# Patient Record
Sex: MALE | Race: WHITE | URBAN - METROPOLITAN AREA
[De-identification: names, ages, dates, MRNs, and addresses within clinical notes are randomized per-mention and may not be internally consistent; named-entity substitution may affect disease eponyms.]

---

## 2022-05-20 ENCOUNTER — OFFICE VISIT (OUTPATIENT)
Dept: URBAN - METROPOLITAN AREA CLINIC 60 | Facility: CLINIC | Age: 77
End: 2022-05-20
Payer: MEDICARE

## 2022-05-20 DIAGNOSIS — H43.813 VITREOUS DEGENERATION, BILATERAL: ICD-10-CM

## 2022-05-20 PROCEDURE — 99204 OFFICE O/P NEW MOD 45 MIN: CPT | Performed by: OPHTHALMOLOGY

## 2022-05-20 ASSESSMENT — INTRAOCULAR PRESSURE
OS: 14
OD: 14

## 2022-05-20 ASSESSMENT — KERATOMETRY
OD: 44.34
OS: 44.48

## 2022-05-20 ASSESSMENT — VISUAL ACUITY
OS: 20/40
OD: 20/40

## 2022-05-20 NOTE — IMPRESSION/PLAN
Impression: Combined forms of age-related cataract, bilateral: H25.813. Plan: Cataract accounts for pt complaints. Pt desires sx. Schedule CE/IOL both eyes, right then left. Risk/Benefits/Alternatives discussed with patient. Rec. mono-focal/Toric/Vivity. Consider ORA/LRI. RL2. Target near. Order a-scan. Possible iris ring. Patient is a candidate for Dexycu. Discussed R/B/A. Recommend Ofloxacin or Tobramycin QID for 1 week postop. Intra-cameral Moxifloxacin to decrease the risk of infection. May elect to use combination drops or generics per patient preference.

## 2022-05-26 ENCOUNTER — TESTING ONLY (OUTPATIENT)
Dept: URBAN - METROPOLITAN AREA CLINIC 60 | Facility: CLINIC | Age: 77
End: 2022-05-26
Payer: MEDICARE

## 2022-05-26 DIAGNOSIS — H25.813 COMBINED FORMS OF AGE-RELATED CATARACT, BILATERAL: Primary | ICD-10-CM

## 2022-05-26 PROCEDURE — 92025 CPTRIZED CORNEAL TOPOGRAPHY: CPT | Performed by: OPHTHALMOLOGY

## 2022-05-26 RX ORDER — OFLOXACIN 3 MG/ML
0.3 % SOLUTION/ DROPS OPHTHALMIC
Qty: 5 | Refills: 1 | Status: ACTIVE
Start: 2022-05-26

## 2022-05-26 ASSESSMENT — PACHYMETRY
OD: 25.55
OD: 3.36
OS: 3.45
OS: 26.24

## 2022-06-07 ENCOUNTER — PROCEDURE (OUTPATIENT)
Dept: URBAN - METROPOLITAN AREA SURGERY 37 | Facility: SURGERY | Age: 77
End: 2022-06-07
Payer: MEDICARE

## 2022-06-07 DIAGNOSIS — H57.03 MIOSIS: ICD-10-CM

## 2022-06-07 DIAGNOSIS — H52.223 REGULAR ASTIGMATISM, BILATERAL: Primary | ICD-10-CM

## 2022-06-07 DIAGNOSIS — H25.813 COMBINED FORMS OF AGE-RELATED CATARACT, BILATERAL: ICD-10-CM

## 2022-06-07 PROCEDURE — 66982 XCAPSL CTRC RMVL CPLX WO ECP: CPT | Performed by: OPHTHALMOLOGY

## 2022-06-07 PROCEDURE — PR1CP PR1CP: CUSTOM | Performed by: OPHTHALMOLOGY

## 2022-06-08 ENCOUNTER — POST-OPERATIVE VISIT (OUTPATIENT)
Dept: URBAN - METROPOLITAN AREA CLINIC 60 | Facility: CLINIC | Age: 77
End: 2022-06-08
Payer: MEDICARE

## 2022-06-08 DIAGNOSIS — Z48.810 ENCOUNTER FOR SURGICAL AFTERCARE FOLLOWING SURGERY ON A SENSE ORGAN: Primary | ICD-10-CM

## 2022-06-08 PROCEDURE — 99024 POSTOP FOLLOW-UP VISIT: CPT | Performed by: OPTOMETRIST

## 2022-06-08 ASSESSMENT — INTRAOCULAR PRESSURE: OD: 16

## 2022-06-08 NOTE — IMPRESSION/PLAN
Impression: S/P Cataract Extraction by phacoemulsification with IOL placement; ORA; LRI (Limbal Relaxing Incision) OD - 1 Day. Encounter for surgical aftercare following surgery on a sense organ  Z48.810. Plan: Continue with Ofloxacin OD. Recommend patient use artificial tears.

## 2022-06-15 ENCOUNTER — POST-OPERATIVE VISIT (OUTPATIENT)
Dept: URBAN - METROPOLITAN AREA CLINIC 60 | Facility: CLINIC | Age: 77
End: 2022-06-15
Payer: MEDICARE

## 2022-06-15 DIAGNOSIS — Z48.810 ENCOUNTER FOR SURGICAL AFTERCARE FOLLOWING SURGERY ON A SENSE ORGAN: Primary | ICD-10-CM

## 2022-06-15 PROCEDURE — 99024 POSTOP FOLLOW-UP VISIT: CPT | Performed by: OPTOMETRIST

## 2022-06-15 ASSESSMENT — VISUAL ACUITY
OS: 20/50
OD: 20/40

## 2022-06-15 ASSESSMENT — INTRAOCULAR PRESSURE
OD: 16
OS: 14

## 2022-06-15 NOTE — IMPRESSION/PLAN
Impression: S/P CE/Standard IOL with LRI OD - 8 Days. Encounter for surgical aftercare following surgery on a sense organ  Z48.810.  Plan:

## 2022-06-21 ENCOUNTER — PROCEDURE (OUTPATIENT)
Dept: URBAN - METROPOLITAN AREA SURGERY 37 | Facility: SURGERY | Age: 77
End: 2022-06-21
Payer: MEDICARE

## 2022-06-21 DIAGNOSIS — H25.812 COMBINED FORMS OF AGE-RELATED CATARACT, LEFT EYE: ICD-10-CM

## 2022-06-21 DIAGNOSIS — H52.223 REGULAR ASTIGMATISM, BILATERAL: Primary | ICD-10-CM

## 2022-06-21 PROCEDURE — PR1CP PR1CP: CUSTOM | Performed by: OPHTHALMOLOGY

## 2022-06-21 PROCEDURE — 66982 XCAPSL CTRC RMVL CPLX WO ECP: CPT | Performed by: OPHTHALMOLOGY

## 2022-06-22 ENCOUNTER — POST-OPERATIVE VISIT (OUTPATIENT)
Dept: URBAN - METROPOLITAN AREA CLINIC 60 | Facility: CLINIC | Age: 77
End: 2022-06-22
Payer: MEDICARE

## 2022-06-22 DIAGNOSIS — Z96.1 PRESENCE OF INTRAOCULAR LENS: Primary | ICD-10-CM

## 2022-06-22 PROCEDURE — 99024 POSTOP FOLLOW-UP VISIT: CPT | Performed by: OPTOMETRIST

## 2022-06-22 NOTE — IMPRESSION/PLAN
Impression: S/P Cataract Extraction by phacoemulsification with IOL placement; ORA; LRI (Limbal Relaxing Incision) OS - 1 Day. Presence of intraocular lens  Z96.1.  Plan:

## 2022-06-28 ENCOUNTER — POST-OPERATIVE VISIT (OUTPATIENT)
Dept: URBAN - METROPOLITAN AREA CLINIC 60 | Facility: CLINIC | Age: 77
End: 2022-06-28
Payer: MEDICARE

## 2022-06-28 DIAGNOSIS — Z96.1 PRESENCE OF INTRAOCULAR LENS: Primary | ICD-10-CM

## 2022-06-28 PROCEDURE — 99024 POSTOP FOLLOW-UP VISIT: CPT | Performed by: OPTOMETRIST

## 2022-06-28 ASSESSMENT — INTRAOCULAR PRESSURE
OS: 15
OD: 15

## 2022-06-28 ASSESSMENT — VISUAL ACUITY
OS: 20/25
OD: 20/30

## 2022-06-28 NOTE — IMPRESSION/PLAN
Impression: S/P Cataract Extraction by phacoemulsification with IOL placement; ORA; LRI (Limbal Relaxing Incision) OS - 7 Days. Presence of intraocular lens  Z96.1. Plan: Recommend patient perform warm compresses.

## 2022-07-12 ENCOUNTER — POST-OPERATIVE VISIT (OUTPATIENT)
Dept: URBAN - METROPOLITAN AREA CLINIC 60 | Facility: CLINIC | Age: 77
End: 2022-07-12
Payer: MEDICARE

## 2022-07-12 DIAGNOSIS — Z96.1 PRESENCE OF INTRAOCULAR LENS: Primary | ICD-10-CM

## 2022-07-12 PROCEDURE — 99024 POSTOP FOLLOW-UP VISIT: CPT | Performed by: OPTOMETRIST

## 2022-07-12 ASSESSMENT — VISUAL ACUITY
OS: 20/25
OD: 20/30

## 2022-07-12 ASSESSMENT — INTRAOCULAR PRESSURE
OD: 14
OS: 15

## 2022-07-12 NOTE — IMPRESSION/PLAN
Impression: S/P CE/Standard IOL with LRI OS - 21 Days. Presence of intraocular lens  Z96.1. Plan: Well positioned IOL(s).

## 2023-07-13 ENCOUNTER — OFFICE VISIT (OUTPATIENT)
Dept: URBAN - METROPOLITAN AREA CLINIC 60 | Facility: CLINIC | Age: 78
End: 2023-07-13
Payer: MEDICARE

## 2023-07-13 DIAGNOSIS — H18.593 OTHER HEREDITARY CORNEAL DYSTROPHIES: ICD-10-CM

## 2023-07-13 DIAGNOSIS — H43.813 VITREOUS DEGENERATION, BILATERAL: Primary | ICD-10-CM

## 2023-07-13 DIAGNOSIS — H53.2 DIPLOPIA: ICD-10-CM

## 2023-07-13 DIAGNOSIS — H52.4 PRESBYOPIA: ICD-10-CM

## 2023-07-13 PROCEDURE — 92014 COMPRE OPH EXAM EST PT 1/>: CPT | Performed by: OPTOMETRIST

## 2023-07-13 PROCEDURE — 92025 CPTRIZED CORNEAL TOPOGRAPHY: CPT | Performed by: OPTOMETRIST

## 2023-07-13 ASSESSMENT — INTRAOCULAR PRESSURE
OD: 12
OS: 9

## 2023-07-13 ASSESSMENT — VISUAL ACUITY: OD: 20/25

## 2023-07-13 NOTE — IMPRESSION/PLAN
Impression: Other hereditary corneal dystrophies: H18.593. Plan: Discussed findings with patient. Recommend Topography to monitor changes. Testing done today and discussed with patient.

## 2023-07-13 NOTE — IMPRESSION/PLAN
Impression: Diplopia: H53.2. Plan: Discussed findings with patient. Discussed possible need for Prism in the glasses in the future.

## 2024-07-15 ENCOUNTER — OFFICE VISIT (OUTPATIENT)
Dept: URBAN - METROPOLITAN AREA CLINIC 60 | Facility: CLINIC | Age: 79
End: 2024-07-15
Payer: MEDICARE

## 2024-07-15 DIAGNOSIS — H18.593 OTHER HEREDITARY CORNEAL DYSTROPHIES: Primary | ICD-10-CM

## 2024-07-15 DIAGNOSIS — Z96.1 PRESENCE OF INTRAOCULAR LENS: ICD-10-CM

## 2024-07-15 DIAGNOSIS — H43.813 VITREOUS DEGENERATION, BILATERAL: ICD-10-CM

## 2024-07-15 PROCEDURE — 92014 COMPRE OPH EXAM EST PT 1/>: CPT | Performed by: OPTOMETRIST

## 2024-07-15 PROCEDURE — 92025 CPTRIZED CORNEAL TOPOGRAPHY: CPT | Performed by: OPTOMETRIST

## 2024-07-15 ASSESSMENT — INTRAOCULAR PRESSURE
OS: 16
OD: 15

## 2024-07-15 ASSESSMENT — VISUAL ACUITY
OS: 20/25
OD: 20/25